# Patient Record
Sex: MALE | Race: BLACK OR AFRICAN AMERICAN | NOT HISPANIC OR LATINO | Employment: FULL TIME | URBAN - METROPOLITAN AREA
[De-identification: names, ages, dates, MRNs, and addresses within clinical notes are randomized per-mention and may not be internally consistent; named-entity substitution may affect disease eponyms.]

---
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
## 2024-09-05 ENCOUNTER — OFFICE VISIT (OUTPATIENT)
Dept: FAMILY MEDICINE CLINIC | Facility: CLINIC | Age: 19
End: 2024-09-05
Payer: COMMERCIAL

## 2024-09-05 ENCOUNTER — LAB (OUTPATIENT)
Dept: LAB | Facility: HOSPITAL | Age: 19
End: 2024-09-05
Payer: COMMERCIAL

## 2024-09-05 VITALS
WEIGHT: 135.6 LBS | DIASTOLIC BLOOD PRESSURE: 76 MMHG | HEIGHT: 68 IN | BODY MASS INDEX: 20.55 KG/M2 | OXYGEN SATURATION: 99 % | HEART RATE: 91 BPM | SYSTOLIC BLOOD PRESSURE: 124 MMHG

## 2024-09-05 DIAGNOSIS — L81.9 DISCOLORATION OF SKIN OF FACE: Primary | ICD-10-CM

## 2024-09-05 DIAGNOSIS — Z00.00 HEALTHCARE MAINTENANCE: ICD-10-CM

## 2024-09-05 LAB
ALBUMIN SERPL-MCNC: 4.8 G/DL (ref 3.5–5.2)
ALBUMIN/GLOB SERPL: 1.5 G/DL
ALP SERPL-CCNC: 70 U/L (ref 39–117)
ALT SERPL W P-5'-P-CCNC: 18 U/L (ref 1–41)
ANION GAP SERPL CALCULATED.3IONS-SCNC: 12.7 MMOL/L (ref 5–15)
AST SERPL-CCNC: 21 U/L (ref 1–40)
BILIRUB SERPL-MCNC: 0.9 MG/DL (ref 0–1.2)
BUN SERPL-MCNC: 13 MG/DL (ref 6–20)
BUN/CREAT SERPL: 12.3 (ref 7–25)
CALCIUM SPEC-SCNC: 10.3 MG/DL (ref 8.6–10.5)
CHLORIDE SERPL-SCNC: 103 MMOL/L (ref 98–107)
CO2 SERPL-SCNC: 23.3 MMOL/L (ref 22–29)
CREAT SERPL-MCNC: 1.06 MG/DL (ref 0.76–1.27)
DEPRECATED RDW RBC AUTO: 33.2 FL (ref 37–54)
EGFRCR SERPLBLD CKD-EPI 2021: 103 ML/MIN/1.73
ERYTHROCYTE [DISTWIDTH] IN BLOOD BY AUTOMATED COUNT: 12.3 % (ref 12.3–15.4)
GLOBULIN UR ELPH-MCNC: 3.2 GM/DL
GLUCOSE SERPL-MCNC: 94 MG/DL (ref 65–99)
HBV SURFACE AG SERPL QL IA: NORMAL
HCT VFR BLD AUTO: 45.2 % (ref 37.5–51)
HCV AB SER QL: NORMAL
HGB BLD-MCNC: 13.5 G/DL (ref 13–17.7)
HIV 1+2 AB+HIV1 P24 AG SERPL QL IA: NORMAL
MCH RBC QN AUTO: 22.8 PG (ref 26.6–33)
MCHC RBC AUTO-ENTMCNC: 29.9 G/DL (ref 31.5–35.7)
MCV RBC AUTO: 76.2 FL (ref 79–97)
PLATELET # BLD AUTO: 239 10*3/MM3 (ref 140–450)
PMV BLD AUTO: 11.4 FL (ref 6–12)
POTASSIUM SERPL-SCNC: 3.8 MMOL/L (ref 3.5–5.2)
PROT SERPL-MCNC: 8 G/DL (ref 6–8.5)
RBC # BLD AUTO: 5.93 10*6/MM3 (ref 4.14–5.8)
RPR SER QL: NORMAL
SODIUM SERPL-SCNC: 139 MMOL/L (ref 136–145)
TSH SERPL DL<=0.05 MIU/L-ACNC: 2.9 UIU/ML (ref 0.27–4.2)
WBC NRBC COR # BLD AUTO: 4 10*3/MM3 (ref 3.4–10.8)

## 2024-09-05 PROCEDURE — G0432 EIA HIV-1/HIV-2 SCREEN: HCPCS

## 2024-09-05 PROCEDURE — 86803 HEPATITIS C AB TEST: CPT

## 2024-09-05 PROCEDURE — 80050 GENERAL HEALTH PANEL: CPT

## 2024-09-05 PROCEDURE — 99385 PREV VISIT NEW AGE 18-39: CPT | Performed by: PHYSICIAN ASSISTANT

## 2024-09-05 PROCEDURE — 86592 SYPHILIS TEST NON-TREP QUAL: CPT

## 2024-09-05 PROCEDURE — 87340 HEPATITIS B SURFACE AG IA: CPT

## 2024-09-05 NOTE — PROGRESS NOTES
Chief Complaint   Patient presents with    Skin Discoloration     DISCOLOR AROUND CHIN AND NOSE (4-5 months)          HPI     Joss Beauchamp is a pleasant 19 y.o. male who presents for initial visit.     The patient presents to establish care. He previously saw Dr. 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 Wants to get into electrical work.     Primary concern today is a change in skin color around his nose and chin for 4-5 months. He has some flaking and itching for which he has been using Aquaphor. Denies history of eczema or other skin problems. He is not established with dermatology. There is a family history of autoimmune disease but he is not sure on specifics.   He reports a healthy diet. No current exercise.   Overdue for dental and eye exams.   He believes he is up to date on childhood immunizations including Gardasil.   Denies tobacco, alcohol, and recreational drug use.     Past Medical History:   Diagnosis Date    Allergic     Heart murmur        History reviewed. No pertinent surgical history.    Family History   Problem Relation Age of Onset    Breast cancer Maternal Grandmother        Social History     Socioeconomic History    Marital status: Single   Tobacco Use    Smoking status: Never    Smokeless tobacco: Never   Vaping Use    Vaping status: Never Used   Substance and Sexual Activity    Alcohol use: Never    Drug use: Never    Sexual activity: Defer       No Known Allergies    ROS    Review of Systems   Constitutional:  Negative for appetite change, chills, diaphoresis, fatigue, fever and unexpected weight loss.   HENT:  Negative for congestion, hearing loss, sore throat and swollen glands.    Eyes:  Negative for blurred vision and visual disturbance.   Respiratory:  Negative for cough and shortness of breath.    Cardiovascular:  Negative for chest pain.   Gastrointestinal:  Negative for abdominal pain, blood in stool, constipation and  diarrhea.   Endocrine: Negative for polydipsia.   Genitourinary:  Negative for difficulty urinating, dysuria, hematuria, nocturia, scrotal swelling and testicular pain.   Musculoskeletal:  Negative for arthralgias and myalgias.   Skin:  Positive for rash.   Neurological:  Negative for dizziness, numbness and headache.   Hematological:  Negative for adenopathy.   Psychiatric/Behavioral:  Negative for depressed mood. The patient is not nervous/anxious.        Vitals:    09/05/24 0957   BP: 124/76   Pulse: 91   SpO2: 99%     Body mass index is 20.62 kg/m².      Current Outpatient Medications:     cetirizine (zyrTEC) 10 MG tablet, Take 1 tablet by mouth Daily., Disp: , Rfl:     ibuprofen (ADVIL,MOTRIN) 800 MG tablet, Take 1 tablet by mouth Every 8 (Eight) Hours As Needed for Mild Pain ., Disp: 90 tablet, Rfl: 0    Multiple Vitamin (MULTI VITAMIN PO), Take  by mouth., Disp: , Rfl:     methylPREDNISolone (MEDROL) 4 MG dose pack, Take as directed on package instructions. (Patient not taking: Reported on 9/5/2024), Disp: 21 tablet, Rfl: 0    PE    Physical Exam  Vitals reviewed.   Constitutional:       General: He is not in acute distress.     Appearance: He is well-developed.   HENT:      Head: Normocephalic and atraumatic.      Right Ear: Hearing and tympanic membrane normal.      Left Ear: Hearing and tympanic membrane normal.      Mouth/Throat:      Mouth: Mucous membranes are moist.      Dentition: Normal dentition.      Pharynx: Oropharynx is clear.   Eyes:      Extraocular Movements: Extraocular movements intact.      Conjunctiva/sclera: Conjunctivae normal.      Pupils: Pupils are equal, round, and reactive to light.      Comments:      Neck:      Thyroid: No thyroid mass or thyromegaly.      Vascular: No carotid bruit.   Cardiovascular:      Rate and Rhythm: Normal rate and regular rhythm.      Heart sounds: No murmur heard.     No friction rub.   Pulmonary:      Effort: Pulmonary effort is normal.      Breath  sounds: Normal breath sounds.   Abdominal:      General: Bowel sounds are normal. There is no abdominal bruit.      Palpations: Abdomen is soft. There is no mass.      Tenderness: There is no abdominal tenderness.   Musculoskeletal:         General: Normal range of motion.      Cervical back: Normal range of motion and neck supple.   Lymphadenopathy:      Cervical: No cervical adenopathy.      Upper Body:      Right upper body: No supraclavicular adenopathy.      Left upper body: No supraclavicular adenopathy.   Skin:     General: Skin is warm and dry.      Findings: Rash present.      Nails: There is no clubbing.      Comments: Hypopigmentation of skin of face around the nose, chin, lateral aspects of mouth. No suspicious nevi on clothed exam.    Neurological:      Mental Status: He is alert.      Gait: Gait normal.      Deep Tendon Reflexes: Reflexes normal.   Psychiatric:         Speech: Speech normal.         Behavior: Behavior normal.          A/P    Problem List Items Addressed This Visit    None  Visit Diagnoses       Discoloration of skin of face    -  Primary    Relevant Orders    Ambulatory Referral to Dermatology (Completed)    Healthcare maintenance        Relevant Orders    CBC (No Diff)    Comprehensive Metabolic Panel    TSH Rfx On Abnormal To Free T4    HIV-1 / O / 2 Ag / Antibody    RPR Qualitative with Reflex to Quant    Hepatitis C Antibody    Hepatitis B Surface Antigen          -Counseled patient regarding cancer screening, immunizations, healthy lifestyle, diet, maintaining a healthy weight, and exercise.   -Recommended influenza vaccine late October.   -Annual dental and eye exams were encouraged.   -Check labs as ordered.   -Refer to dermatology for facial skin discoloration. T-zone distribution, possible seborrheic dermatitis.   -RTC in 1 year for annual physical, sooner prn.       Plan of care was reviewed with patient at the conclusion of today's visit. Education was provided regarding  diagnoses, management, prescribed or recommended OTC products, and the importance of compliance with follow-up appointments. The patient was counseled regarding the risks, benefits, and possible side-effects of treatment. I advised the patient to keep me informed of any acute changes in their status including new, worsening, or persistent symptoms. Patient expresses understanding and agreement with the management plan.        Kirill Richardson PA-C